# Patient Record
Sex: FEMALE | Race: WHITE | NOT HISPANIC OR LATINO | URBAN - METROPOLITAN AREA
[De-identification: names, ages, dates, MRNs, and addresses within clinical notes are randomized per-mention and may not be internally consistent; named-entity substitution may affect disease eponyms.]

---

## 2020-01-01 ENCOUNTER — INPATIENT (INPATIENT)
Facility: HOSPITAL | Age: 80
LOS: 1 days | DRG: 208 | End: 2020-03-13
Attending: INTERNAL MEDICINE | Admitting: INTERNAL MEDICINE
Payer: MEDICARE

## 2020-01-01 VITALS
HEART RATE: 111 BPM | SYSTOLIC BLOOD PRESSURE: 119 MMHG | RESPIRATION RATE: 16 BRPM | OXYGEN SATURATION: 97 % | DIASTOLIC BLOOD PRESSURE: 70 MMHG

## 2020-01-01 VITALS — HEART RATE: 98 BPM | OXYGEN SATURATION: 99 %

## 2020-01-01 DIAGNOSIS — J96.01 ACUTE RESPIRATORY FAILURE WITH HYPOXIA: ICD-10-CM

## 2020-01-01 DIAGNOSIS — J98.11 ATELECTASIS: ICD-10-CM

## 2020-01-01 DIAGNOSIS — I26.99 OTHER PULMONARY EMBOLISM WITHOUT ACUTE COR PULMONALE: ICD-10-CM

## 2020-01-01 DIAGNOSIS — R09.89 OTHER SPECIFIED SYMPTOMS AND SIGNS INVOLVING THE CIRCULATORY AND RESPIRATORY SYSTEMS: ICD-10-CM

## 2020-01-01 DIAGNOSIS — I26.09 OTHER PULMONARY EMBOLISM WITH ACUTE COR PULMONALE: ICD-10-CM

## 2020-01-01 LAB
ALBUMIN SERPL ELPH-MCNC: 2.6 G/DL — LOW (ref 3.3–5)
ALBUMIN SERPL ELPH-MCNC: 2.6 G/DL — LOW (ref 3.3–5)
ALBUMIN SERPL ELPH-MCNC: 3.1 G/DL — LOW (ref 3.3–5)
ALBUMIN SERPL ELPH-MCNC: 3.8 G/DL — SIGNIFICANT CHANGE UP (ref 3.4–5)
ALP SERPL-CCNC: 131 U/L — HIGH (ref 40–120)
ALP SERPL-CCNC: 66 U/L — SIGNIFICANT CHANGE UP (ref 40–120)
ALP SERPL-CCNC: 80 U/L — SIGNIFICANT CHANGE UP (ref 40–120)
ALP SERPL-CCNC: 89 U/L — SIGNIFICANT CHANGE UP (ref 40–120)
ALT FLD-CCNC: 133 U/L — HIGH (ref 12–42)
ALT FLD-CCNC: 293 U/L — HIGH (ref 10–45)
ALT FLD-CCNC: 4280 U/L — HIGH (ref 10–45)
ALT FLD-CCNC: 512 U/L — HIGH (ref 10–45)
ANION GAP SERPL CALC-SCNC: 15 MMOL/L — SIGNIFICANT CHANGE UP (ref 9–16)
ANION GAP SERPL CALC-SCNC: 22 MMOL/L — HIGH (ref 5–17)
ANION GAP SERPL CALC-SCNC: 23 MMOL/L — HIGH (ref 5–17)
ANION GAP SERPL CALC-SCNC: 27 MMOL/L — HIGH (ref 5–17)
ANION GAP SERPL CALC-SCNC: 27 MMOL/L — HIGH (ref 5–17)
ANION GAP SERPL CALC-SCNC: 29 MMOL/L — HIGH (ref 5–17)
APPEARANCE UR: CLEAR — SIGNIFICANT CHANGE UP
APTT BLD: 23 SEC — LOW (ref 27.5–36.3)
APTT BLD: 97.1 SEC — HIGH (ref 27.5–36.3)
AST SERPL-CCNC: 178 U/L — HIGH (ref 15–37)
AST SERPL-CCNC: 368 U/L — HIGH (ref 10–40)
AST SERPL-CCNC: 561 U/L — HIGH (ref 10–40)
AST SERPL-CCNC: 5793 U/L — HIGH (ref 10–40)
BACTERIA # UR AUTO: PRESENT /HPF
BASOPHILS # BLD AUTO: 0.06 K/UL — SIGNIFICANT CHANGE UP (ref 0–0.2)
BASOPHILS NFR BLD AUTO: 0.2 % — SIGNIFICANT CHANGE UP (ref 0–2)
BILIRUB SERPL-MCNC: 0.6 MG/DL — SIGNIFICANT CHANGE UP (ref 0.2–1.2)
BILIRUB SERPL-MCNC: 1 MG/DL — SIGNIFICANT CHANGE UP (ref 0.2–1.2)
BILIRUB SERPL-MCNC: 1 MG/DL — SIGNIFICANT CHANGE UP (ref 0.2–1.2)
BILIRUB SERPL-MCNC: 1.2 MG/DL — SIGNIFICANT CHANGE UP (ref 0.2–1.2)
BILIRUB UR-MCNC: ABNORMAL
BLD GP AB SCN SERPL QL: NEGATIVE — SIGNIFICANT CHANGE UP
BUN SERPL-MCNC: 18 MG/DL — SIGNIFICANT CHANGE UP (ref 7–23)
BUN SERPL-MCNC: 18 MG/DL — SIGNIFICANT CHANGE UP (ref 7–23)
BUN SERPL-MCNC: 20 MG/DL — SIGNIFICANT CHANGE UP (ref 7–23)
BUN SERPL-MCNC: 20 MG/DL — SIGNIFICANT CHANGE UP (ref 7–23)
BUN SERPL-MCNC: 21 MG/DL — SIGNIFICANT CHANGE UP (ref 7–23)
BUN SERPL-MCNC: 23 MG/DL — SIGNIFICANT CHANGE UP (ref 7–23)
CALCIUM SERPL-MCNC: 7.1 MG/DL — LOW (ref 8.4–10.5)
CALCIUM SERPL-MCNC: 7.3 MG/DL — LOW (ref 8.4–10.5)
CALCIUM SERPL-MCNC: 7.6 MG/DL — LOW (ref 8.4–10.5)
CALCIUM SERPL-MCNC: 9.4 MG/DL — SIGNIFICANT CHANGE UP (ref 8.5–10.5)
CHLORIDE SERPL-SCNC: 102 MMOL/L — SIGNIFICANT CHANGE UP (ref 96–108)
CHLORIDE SERPL-SCNC: 104 MMOL/L — SIGNIFICANT CHANGE UP (ref 96–108)
CHLORIDE SERPL-SCNC: 105 MMOL/L — SIGNIFICANT CHANGE UP (ref 96–108)
CHLORIDE SERPL-SCNC: 106 MMOL/L — SIGNIFICANT CHANGE UP (ref 96–108)
CHLORIDE SERPL-SCNC: 110 MMOL/L — HIGH (ref 96–108)
CHLORIDE SERPL-SCNC: 110 MMOL/L — HIGH (ref 96–108)
CHOLEST SERPL-MCNC: 156 MG/DL — SIGNIFICANT CHANGE UP (ref 10–199)
CK MB BLD-MCNC: 4.21 % — HIGH
CK MB CFR SERPL CALC: 14.9 NG/ML — HIGH (ref 0–6.7)
CK MB CFR SERPL CALC: 3.2 NG/ML — SIGNIFICANT CHANGE UP (ref 0.5–3.6)
CK SERPL-CCNC: 175 U/L — HIGH (ref 25–170)
CK SERPL-CCNC: 76 U/L — SIGNIFICANT CHANGE UP (ref 39–308)
CO2 SERPL-SCNC: 10 MMOL/L — CRITICAL LOW (ref 22–31)
CO2 SERPL-SCNC: 11 MMOL/L — LOW (ref 22–31)
CO2 SERPL-SCNC: 13 MMOL/L — LOW (ref 22–31)
CO2 SERPL-SCNC: 13 MMOL/L — LOW (ref 22–31)
CO2 SERPL-SCNC: 18 MMOL/L — LOW (ref 22–31)
CO2 SERPL-SCNC: 24 MMOL/L — SIGNIFICANT CHANGE UP (ref 22–31)
COLOR SPEC: YELLOW — SIGNIFICANT CHANGE UP
COMMENT - URINE: SIGNIFICANT CHANGE UP
CREAT SERPL-MCNC: 1.33 MG/DL — HIGH (ref 0.5–1.3)
CREAT SERPL-MCNC: 1.38 MG/DL — HIGH (ref 0.5–1.3)
CREAT SERPL-MCNC: 1.42 MG/DL — HIGH (ref 0.5–1.3)
CREAT SERPL-MCNC: 1.44 MG/DL — HIGH (ref 0.5–1.3)
CREAT SERPL-MCNC: 1.54 MG/DL — HIGH (ref 0.5–1.3)
CREAT SERPL-MCNC: 1.91 MG/DL — HIGH (ref 0.5–1.3)
DIFF PNL FLD: ABNORMAL
EOSINOPHIL # BLD AUTO: 0.08 K/UL — SIGNIFICANT CHANGE UP (ref 0–0.5)
EOSINOPHIL NFR BLD AUTO: 0.3 % — SIGNIFICANT CHANGE UP (ref 0–6)
EPI CELLS # UR: SIGNIFICANT CHANGE UP /HPF (ref 0–5)
FLU A RESULT: SIGNIFICANT CHANGE UP
FLU A RESULT: SIGNIFICANT CHANGE UP
FLUAV AG NPH QL: SIGNIFICANT CHANGE UP
FLUBV AG NPH QL: SIGNIFICANT CHANGE UP
GAS PNL BLDA: SIGNIFICANT CHANGE UP
GLUCOSE BLDC GLUCOMTR-MCNC: 51 MG/DL — LOW (ref 70–99)
GLUCOSE BLDC GLUCOMTR-MCNC: 89 MG/DL — SIGNIFICANT CHANGE UP (ref 70–99)
GLUCOSE SERPL-MCNC: 18 MG/DL — CRITICAL LOW (ref 70–99)
GLUCOSE SERPL-MCNC: 191 MG/DL — HIGH (ref 70–99)
GLUCOSE SERPL-MCNC: 213 MG/DL — HIGH (ref 70–99)
GLUCOSE SERPL-MCNC: 222 MG/DL — HIGH (ref 70–99)
GLUCOSE SERPL-MCNC: 264 MG/DL — HIGH (ref 70–99)
GLUCOSE SERPL-MCNC: 83 MG/DL — SIGNIFICANT CHANGE UP (ref 70–99)
GLUCOSE UR QL: NEGATIVE — SIGNIFICANT CHANGE UP
HBA1C BLD-MCNC: 5.2 % — SIGNIFICANT CHANGE UP (ref 4–5.6)
HCT VFR BLD CALC: 39.7 % — SIGNIFICANT CHANGE UP (ref 34.5–45)
HCT VFR BLD CALC: 40.8 % — SIGNIFICANT CHANGE UP (ref 34.5–45)
HCT VFR BLD CALC: 41.2 % — SIGNIFICANT CHANGE UP (ref 39–50)
HCT VFR BLD CALC: 43 % — SIGNIFICANT CHANGE UP (ref 34.5–45)
HDLC SERPL-MCNC: 41 MG/DL — LOW
HGB BLD-MCNC: 12.2 G/DL — SIGNIFICANT CHANGE UP (ref 11.5–15.5)
HGB BLD-MCNC: 12.2 G/DL — SIGNIFICANT CHANGE UP (ref 11.5–15.5)
HGB BLD-MCNC: 12.3 G/DL — SIGNIFICANT CHANGE UP (ref 11.5–15.5)
HGB BLD-MCNC: 13.3 G/DL — SIGNIFICANT CHANGE UP (ref 13–17)
IMM GRANULOCYTES NFR BLD AUTO: 0.7 % — SIGNIFICANT CHANGE UP (ref 0–1.5)
INR BLD: 1.34 — HIGH (ref 0.88–1.16)
KETONES UR-MCNC: ABNORMAL MG/DL
LACTATE SERPL-SCNC: 10.5 MMOL/L — CRITICAL HIGH (ref 0.5–2)
LACTATE SERPL-SCNC: 12.5 MMOL/L — CRITICAL HIGH (ref 0.5–2)
LACTATE SERPL-SCNC: 5.7 MMOL/L — CRITICAL HIGH (ref 0.4–2)
LACTATE SERPL-SCNC: 5.9 MMOL/L — CRITICAL HIGH (ref 0.4–2)
LACTATE SERPL-SCNC: 9.1 MMOL/L — CRITICAL HIGH (ref 0.5–2)
LEUKOCYTE ESTERASE UR-ACNC: ABNORMAL
LIPID PNL WITH DIRECT LDL SERPL: 91 MG/DL — SIGNIFICANT CHANGE UP
LYMPHOCYTES # BLD AUTO: 12 % — LOW (ref 13–44)
LYMPHOCYTES # BLD AUTO: 2.91 K/UL — SIGNIFICANT CHANGE UP (ref 1–3.3)
MAGNESIUM SERPL-MCNC: 2.8 MG/DL — HIGH (ref 1.6–2.6)
MCHC RBC-ENTMCNC: 26.7 PG — LOW (ref 27–34)
MCHC RBC-ENTMCNC: 27.4 PG — SIGNIFICANT CHANGE UP (ref 27–34)
MCHC RBC-ENTMCNC: 27.5 PG — SIGNIFICANT CHANGE UP (ref 27–34)
MCHC RBC-ENTMCNC: 27.7 PG — SIGNIFICANT CHANGE UP (ref 27–34)
MCHC RBC-ENTMCNC: 28.6 GM/DL — LOW (ref 32–36)
MCHC RBC-ENTMCNC: 29.9 GM/DL — LOW (ref 32–36)
MCHC RBC-ENTMCNC: 30.7 GM/DL — LOW (ref 32–36)
MCHC RBC-ENTMCNC: 32.3 GM/DL — SIGNIFICANT CHANGE UP (ref 32–36)
MCV RBC AUTO: 84.9 FL — SIGNIFICANT CHANGE UP (ref 80–100)
MCV RBC AUTO: 89.6 FL — SIGNIFICANT CHANGE UP (ref 80–100)
MCV RBC AUTO: 92.5 FL — SIGNIFICANT CHANGE UP (ref 80–100)
MCV RBC AUTO: 93.3 FL — SIGNIFICANT CHANGE UP (ref 80–100)
MONOCYTES # BLD AUTO: 0.97 K/UL — HIGH (ref 0–0.9)
MONOCYTES NFR BLD AUTO: 4 % — SIGNIFICANT CHANGE UP (ref 2–14)
NEUTROPHILS # BLD AUTO: 20.04 K/UL — HIGH (ref 1.8–7.4)
NEUTROPHILS NFR BLD AUTO: 82.8 % — HIGH (ref 43–77)
NITRITE UR-MCNC: POSITIVE
NRBC # BLD: 0 /100 WBCS — SIGNIFICANT CHANGE UP (ref 0–0)
NT-PROBNP SERPL-SCNC: 721 PG/ML — HIGH
PCO2 BLDA: 53 MMHG — HIGH (ref 32–45)
PH BLDA: 7.07 — CRITICAL LOW (ref 7.35–7.45)
PH UR: 5.5 — SIGNIFICANT CHANGE UP (ref 5–8)
PHOSPHATE SERPL-MCNC: 14.7 MG/DL — HIGH (ref 2.5–4.5)
PHOSPHATE SERPL-MCNC: 9.6 MG/DL — HIGH (ref 2.5–4.5)
PLATELET # BLD AUTO: 130 K/UL — LOW (ref 150–400)
PLATELET # BLD AUTO: 239 K/UL — SIGNIFICANT CHANGE UP (ref 150–400)
PLATELET # BLD AUTO: 87 K/UL — LOW (ref 150–400)
PLATELET # BLD AUTO: 95 K/UL — LOW (ref 150–400)
PO2 BLDA: 309 MMHG — HIGH (ref 83–108)
POTASSIUM SERPL-MCNC: 3.6 MMOL/L — SIGNIFICANT CHANGE UP (ref 3.5–5.3)
POTASSIUM SERPL-MCNC: 4.2 MMOL/L — SIGNIFICANT CHANGE UP (ref 3.5–5.3)
POTASSIUM SERPL-MCNC: 4.7 MMOL/L — SIGNIFICANT CHANGE UP (ref 3.5–5.3)
POTASSIUM SERPL-MCNC: 5.7 MMOL/L — HIGH (ref 3.5–5.3)
POTASSIUM SERPL-MCNC: 6 MMOL/L — HIGH (ref 3.5–5.3)
POTASSIUM SERPL-MCNC: 6.6 MMOL/L — CRITICAL HIGH (ref 3.5–5.3)
POTASSIUM SERPL-SCNC: 3.6 MMOL/L — SIGNIFICANT CHANGE UP (ref 3.5–5.3)
POTASSIUM SERPL-SCNC: 4.2 MMOL/L — SIGNIFICANT CHANGE UP (ref 3.5–5.3)
POTASSIUM SERPL-SCNC: 4.7 MMOL/L — SIGNIFICANT CHANGE UP (ref 3.5–5.3)
POTASSIUM SERPL-SCNC: 5.7 MMOL/L — HIGH (ref 3.5–5.3)
POTASSIUM SERPL-SCNC: 6 MMOL/L — HIGH (ref 3.5–5.3)
POTASSIUM SERPL-SCNC: 6.6 MMOL/L — CRITICAL HIGH (ref 3.5–5.3)
PROT SERPL-MCNC: 4.4 G/DL — LOW (ref 6–8.3)
PROT SERPL-MCNC: 4.5 G/DL — LOW (ref 6–8.3)
PROT SERPL-MCNC: 5.2 G/DL — LOW (ref 6–8.3)
PROT SERPL-MCNC: 7.6 G/DL — SIGNIFICANT CHANGE UP (ref 6.4–8.2)
PROT UR-MCNC: 100 MG/DL
PROTHROM AB SERPL-ACNC: 14.9 SEC — HIGH (ref 10–12.9)
RAPID RVP RESULT: SIGNIFICANT CHANGE UP
RBC # BLD: 4.41 M/UL — SIGNIFICANT CHANGE UP (ref 3.8–5.2)
RBC # BLD: 4.43 M/UL — SIGNIFICANT CHANGE UP (ref 3.8–5.2)
RBC # BLD: 4.61 M/UL — SIGNIFICANT CHANGE UP (ref 3.8–5.2)
RBC # BLD: 4.85 M/UL — SIGNIFICANT CHANGE UP (ref 4.2–5.8)
RBC # FLD: 14.5 % — SIGNIFICANT CHANGE UP (ref 10.3–14.5)
RBC # FLD: 14.5 % — SIGNIFICANT CHANGE UP (ref 10.3–14.5)
RBC # FLD: 14.6 % — HIGH (ref 10.3–14.5)
RBC # FLD: 14.8 % — HIGH (ref 10.3–14.5)
RBC CASTS # UR COMP ASSIST: ABNORMAL /HPF
RH IG SCN BLD-IMP: POSITIVE — SIGNIFICANT CHANGE UP
RSV RESULT: SIGNIFICANT CHANGE UP
RSV RNA RESP QL NAA+PROBE: SIGNIFICANT CHANGE UP
SAO2 % BLDA: 99 % — SIGNIFICANT CHANGE UP (ref 95–100)
SARS-COV-2 RNA SPEC QL NAA+PROBE: SIGNIFICANT CHANGE UP
SODIUM SERPL-SCNC: 137 MMOL/L — SIGNIFICANT CHANGE UP (ref 135–145)
SODIUM SERPL-SCNC: 141 MMOL/L — SIGNIFICANT CHANGE UP (ref 132–145)
SODIUM SERPL-SCNC: 145 MMOL/L — SIGNIFICANT CHANGE UP (ref 135–145)
SODIUM SERPL-SCNC: 146 MMOL/L — HIGH (ref 135–145)
SODIUM SERPL-SCNC: 150 MMOL/L — HIGH (ref 135–145)
SODIUM SERPL-SCNC: 150 MMOL/L — HIGH (ref 135–145)
SP GR SPEC: >=1.03 — SIGNIFICANT CHANGE UP (ref 1–1.03)
TOTAL CHOLESTEROL/HDL RATIO MEASUREMENT: 3.8 RATIO — SIGNIFICANT CHANGE UP (ref 3.3–7.1)
TRIGL SERPL-MCNC: 122 MG/DL — SIGNIFICANT CHANGE UP (ref 10–149)
TROPONIN I SERPL-MCNC: 0.32 NG/ML — HIGH (ref 0.02–0.06)
TROPONIN T SERPL-MCNC: 1.16 NG/ML — CRITICAL HIGH (ref 0–0.01)
TROPONIN T SERPL-MCNC: 1.53 NG/ML — CRITICAL HIGH (ref 0–0.01)
TSH SERPL-MCNC: 0.84 UIU/ML — SIGNIFICANT CHANGE UP (ref 0.35–4.94)
UROBILINOGEN FLD QL: 1 E.U./DL — SIGNIFICANT CHANGE UP
WBC # BLD: 24.23 K/UL — HIGH (ref 3.8–10.5)
WBC # BLD: 33.91 K/UL — HIGH (ref 3.8–10.5)
WBC # BLD: 42.11 K/UL — CRITICAL HIGH (ref 3.8–10.5)
WBC # BLD: 43.74 K/UL — CRITICAL HIGH (ref 3.8–10.5)
WBC # FLD AUTO: 24.23 K/UL — HIGH (ref 3.8–10.5)
WBC # FLD AUTO: 33.91 K/UL — HIGH (ref 3.8–10.5)
WBC # FLD AUTO: 42.11 K/UL — CRITICAL HIGH (ref 3.8–10.5)
WBC # FLD AUTO: 43.74 K/UL — CRITICAL HIGH (ref 3.8–10.5)
WBC UR QL: > 10 /HPF

## 2020-01-01 PROCEDURE — 84295 ASSAY OF SERUM SODIUM: CPT

## 2020-01-01 PROCEDURE — 87633 RESP VIRUS 12-25 TARGETS: CPT

## 2020-01-01 PROCEDURE — 36556 INSERT NON-TUNNEL CV CATH: CPT | Mod: XU

## 2020-01-01 PROCEDURE — 96375 TX/PRO/DX INJ NEW DRUG ADDON: CPT | Mod: XU

## 2020-01-01 PROCEDURE — 84100 ASSAY OF PHOSPHORUS: CPT

## 2020-01-01 PROCEDURE — 96374 THER/PROPH/DIAG INJ IV PUSH: CPT | Mod: XU

## 2020-01-01 PROCEDURE — 80048 BASIC METABOLIC PNL TOTAL CA: CPT

## 2020-01-01 PROCEDURE — 71045 X-RAY EXAM CHEST 1 VIEW: CPT | Mod: 26

## 2020-01-01 PROCEDURE — 99292 CRITICAL CARE ADDL 30 MIN: CPT | Mod: 25

## 2020-01-01 PROCEDURE — 36415 COLL VENOUS BLD VENIPUNCTURE: CPT

## 2020-01-01 PROCEDURE — 93010 ELECTROCARDIOGRAM REPORT: CPT | Mod: 76,59

## 2020-01-01 PROCEDURE — 87040 BLOOD CULTURE FOR BACTERIA: CPT

## 2020-01-01 PROCEDURE — 96376 TX/PRO/DX INJ SAME DRUG ADON: CPT | Mod: XU

## 2020-01-01 PROCEDURE — 36556 INSERT NON-TUNNEL CV CATH: CPT

## 2020-01-01 PROCEDURE — 80061 LIPID PANEL: CPT

## 2020-01-01 PROCEDURE — 82962 GLUCOSE BLOOD TEST: CPT

## 2020-01-01 PROCEDURE — 86901 BLOOD TYPING SEROLOGIC RH(D): CPT

## 2020-01-01 PROCEDURE — 87581 M.PNEUMON DNA AMP PROBE: CPT

## 2020-01-01 PROCEDURE — 82553 CREATINE MB FRACTION: CPT

## 2020-01-01 PROCEDURE — 84132 ASSAY OF SERUM POTASSIUM: CPT

## 2020-01-01 PROCEDURE — 82550 ASSAY OF CK (CPK): CPT

## 2020-01-01 PROCEDURE — 83605 ASSAY OF LACTIC ACID: CPT

## 2020-01-01 PROCEDURE — 87798 DETECT AGENT NOS DNA AMP: CPT

## 2020-01-01 PROCEDURE — 31500 INSERT EMERGENCY AIRWAY: CPT | Mod: 59

## 2020-01-01 PROCEDURE — 86850 RBC ANTIBODY SCREEN: CPT

## 2020-01-01 PROCEDURE — 87631 RESP VIRUS 3-5 TARGETS: CPT

## 2020-01-01 PROCEDURE — 85025 COMPLETE CBC W/AUTO DIFF WBC: CPT

## 2020-01-01 PROCEDURE — 82803 BLOOD GASES ANY COMBINATION: CPT

## 2020-01-01 PROCEDURE — 93005 ELECTROCARDIOGRAM TRACING: CPT | Mod: XU

## 2020-01-01 PROCEDURE — 99291 CRITICAL CARE FIRST HOUR: CPT | Mod: 25

## 2020-01-01 PROCEDURE — C1751: CPT

## 2020-01-01 PROCEDURE — 83880 ASSAY OF NATRIURETIC PEPTIDE: CPT

## 2020-01-01 PROCEDURE — 71275 CT ANGIOGRAPHY CHEST: CPT | Mod: 26

## 2020-01-01 PROCEDURE — 71045 X-RAY EXAM CHEST 1 VIEW: CPT | Mod: 26,77

## 2020-01-01 PROCEDURE — 99291 CRITICAL CARE FIRST HOUR: CPT

## 2020-01-01 PROCEDURE — 87486 CHLMYD PNEUM DNA AMP PROBE: CPT

## 2020-01-01 PROCEDURE — 85610 PROTHROMBIN TIME: CPT

## 2020-01-01 PROCEDURE — 82330 ASSAY OF CALCIUM: CPT

## 2020-01-01 PROCEDURE — 71275 CT ANGIOGRAPHY CHEST: CPT

## 2020-01-01 PROCEDURE — 80053 COMPREHEN METABOLIC PANEL: CPT

## 2020-01-01 PROCEDURE — 83735 ASSAY OF MAGNESIUM: CPT

## 2020-01-01 PROCEDURE — 87086 URINE CULTURE/COLONY COUNT: CPT

## 2020-01-01 PROCEDURE — 87186 SC STD MICRODIL/AGAR DIL: CPT

## 2020-01-01 PROCEDURE — 96372 THER/PROPH/DIAG INJ SC/IM: CPT | Mod: XU

## 2020-01-01 PROCEDURE — 83036 HEMOGLOBIN GLYCOSYLATED A1C: CPT

## 2020-01-01 PROCEDURE — 85027 COMPLETE CBC AUTOMATED: CPT

## 2020-01-01 PROCEDURE — 84484 ASSAY OF TROPONIN QUANT: CPT

## 2020-01-01 PROCEDURE — 31500 INSERT EMERGENCY AIRWAY: CPT | Mod: XU

## 2020-01-01 PROCEDURE — 85730 THROMBOPLASTIN TIME PARTIAL: CPT

## 2020-01-01 PROCEDURE — 84443 ASSAY THYROID STIM HORMONE: CPT

## 2020-01-01 PROCEDURE — 71045 X-RAY EXAM CHEST 1 VIEW: CPT

## 2020-01-01 PROCEDURE — 87635 SARS-COV-2 COVID-19 AMP PRB: CPT

## 2020-01-01 PROCEDURE — 93306 TTE W/DOPPLER COMPLETE: CPT

## 2020-01-01 PROCEDURE — 81001 URINALYSIS AUTO W/SCOPE: CPT

## 2020-01-01 RX ORDER — ETOMIDATE 2 MG/ML
20 INJECTION INTRAVENOUS ONCE
Refills: 0 | Status: COMPLETED | OUTPATIENT
Start: 2020-01-01 | End: 2020-01-01

## 2020-01-01 RX ORDER — DEXTROSE 10 % IN WATER 10 %
1000 INTRAVENOUS SOLUTION INTRAVENOUS
Refills: 0 | Status: DISCONTINUED | OUTPATIENT
Start: 2020-01-01 | End: 2020-01-01

## 2020-01-01 RX ORDER — DEXTROSE 50 % IN WATER 50 %
50 SYRINGE (ML) INTRAVENOUS ONCE
Refills: 0 | Status: COMPLETED | OUTPATIENT
Start: 2020-01-01 | End: 2020-01-01

## 2020-01-01 RX ORDER — GLUCAGON INJECTION, SOLUTION 0.5 MG/.1ML
1 INJECTION, SOLUTION SUBCUTANEOUS ONCE
Refills: 0 | Status: COMPLETED | OUTPATIENT
Start: 2020-01-01 | End: 2020-01-01

## 2020-01-01 RX ORDER — FENTANYL CITRATE 50 UG/ML
50 INJECTION INTRAVENOUS ONCE
Refills: 0 | Status: DISCONTINUED | OUTPATIENT
Start: 2020-01-01 | End: 2020-01-01

## 2020-01-01 RX ORDER — HEPARIN SODIUM 5000 [USP'U]/ML
INJECTION INTRAVENOUS; SUBCUTANEOUS
Qty: 25000 | Refills: 0 | Status: DISCONTINUED | OUTPATIENT
Start: 2020-01-01 | End: 2020-01-01

## 2020-01-01 RX ORDER — CALCIUM GLUCONATE 100 MG/ML
2 VIAL (ML) INTRAVENOUS ONCE
Refills: 0 | Status: COMPLETED | OUTPATIENT
Start: 2020-01-01 | End: 2020-01-01

## 2020-01-01 RX ORDER — DOBUTAMINE HCL 250MG/20ML
3 VIAL (ML) INTRAVENOUS
Qty: 500 | Refills: 0 | Status: DISCONTINUED | OUTPATIENT
Start: 2020-01-01 | End: 2020-01-01

## 2020-01-01 RX ORDER — CEFTRIAXONE 500 MG/1
1000 INJECTION, POWDER, FOR SOLUTION INTRAMUSCULAR; INTRAVENOUS EVERY 24 HOURS
Refills: 0 | Status: DISCONTINUED | OUTPATIENT
Start: 2020-01-01 | End: 2020-01-01

## 2020-01-01 RX ORDER — METOPROLOL TARTRATE 50 MG
25 TABLET ORAL ONCE
Refills: 0 | Status: COMPLETED | OUTPATIENT
Start: 2020-01-01 | End: 2020-01-01

## 2020-01-01 RX ORDER — PHENYLEPHRINE HYDROCHLORIDE 10 MG/ML
0 INJECTION INTRAVENOUS ONCE
Refills: 0 | Status: COMPLETED | OUTPATIENT
Start: 2020-01-01 | End: 2020-01-01

## 2020-01-01 RX ORDER — CHLORHEXIDINE GLUCONATE 213 G/1000ML
15 SOLUTION TOPICAL EVERY 12 HOURS
Refills: 0 | Status: DISCONTINUED | OUTPATIENT
Start: 2020-01-01 | End: 2020-01-01

## 2020-01-01 RX ORDER — HEPARIN SODIUM 5000 [USP'U]/ML
4000 INJECTION INTRAVENOUS; SUBCUTANEOUS EVERY 6 HOURS
Refills: 0 | Status: DISCONTINUED | OUTPATIENT
Start: 2020-01-01 | End: 2020-01-01

## 2020-01-01 RX ORDER — HEPARIN SODIUM 5000 [USP'U]/ML
8000 INJECTION INTRAVENOUS; SUBCUTANEOUS EVERY 6 HOURS
Refills: 0 | Status: DISCONTINUED | OUTPATIENT
Start: 2020-01-01 | End: 2020-01-01

## 2020-01-01 RX ORDER — ALTEPLASE 100 MG
50 KIT INTRAVENOUS ONCE
Refills: 0 | Status: COMPLETED | OUTPATIENT
Start: 2020-01-01 | End: 2020-01-01

## 2020-01-01 RX ORDER — HYDROCORTISONE 20 MG
50 TABLET ORAL EVERY 6 HOURS
Refills: 0 | Status: DISCONTINUED | OUTPATIENT
Start: 2020-01-01 | End: 2020-01-01

## 2020-01-01 RX ORDER — HEPARIN SODIUM 5000 [USP'U]/ML
1700 INJECTION INTRAVENOUS; SUBCUTANEOUS
Qty: 25000 | Refills: 0 | Status: DISCONTINUED | OUTPATIENT
Start: 2020-01-01 | End: 2020-01-01

## 2020-01-01 RX ORDER — CEFTRIAXONE 500 MG/1
1000 INJECTION, POWDER, FOR SOLUTION INTRAMUSCULAR; INTRAVENOUS ONCE
Refills: 0 | Status: COMPLETED | OUTPATIENT
Start: 2020-01-01 | End: 2020-01-01

## 2020-01-01 RX ORDER — FENTANYL CITRATE 50 UG/ML
0.5 INJECTION INTRAVENOUS
Qty: 2500 | Refills: 0 | Status: DISCONTINUED | OUTPATIENT
Start: 2020-01-01 | End: 2020-01-01

## 2020-01-01 RX ORDER — PANTOPRAZOLE SODIUM 20 MG/1
40 TABLET, DELAYED RELEASE ORAL EVERY 24 HOURS
Refills: 0 | Status: DISCONTINUED | OUTPATIENT
Start: 2020-01-01 | End: 2020-01-01

## 2020-01-01 RX ORDER — SODIUM BICARBONATE 1 MEQ/ML
150 SYRINGE (ML) INTRAVENOUS ONCE
Refills: 0 | Status: COMPLETED | OUTPATIENT
Start: 2020-01-01 | End: 2020-01-01

## 2020-01-01 RX ORDER — SODIUM BICARBONATE 1 MEQ/ML
100 SYRINGE (ML) INTRAVENOUS ONCE
Refills: 0 | Status: COMPLETED | OUTPATIENT
Start: 2020-01-01 | End: 2020-01-01

## 2020-01-01 RX ORDER — FENTANYL CITRATE 50 UG/ML
100 INJECTION INTRAVENOUS ONCE
Refills: 0 | Status: DISCONTINUED | OUTPATIENT
Start: 2020-01-01 | End: 2020-01-01

## 2020-01-01 RX ORDER — DILTIAZEM HCL 120 MG
20 CAPSULE, EXT RELEASE 24 HR ORAL ONCE
Refills: 0 | Status: COMPLETED | OUTPATIENT
Start: 2020-01-01 | End: 2020-01-01

## 2020-01-01 RX ORDER — NOREPINEPHRINE BITARTRATE/D5W 8 MG/250ML
0.05 PLASTIC BAG, INJECTION (ML) INTRAVENOUS
Qty: 32 | Refills: 0 | Status: DISCONTINUED | OUTPATIENT
Start: 2020-01-01 | End: 2020-01-01

## 2020-01-01 RX ORDER — HEPARIN SODIUM 5000 [USP'U]/ML
8000 INJECTION INTRAVENOUS; SUBCUTANEOUS ONCE
Refills: 0 | Status: COMPLETED | OUTPATIENT
Start: 2020-01-01 | End: 2020-01-01

## 2020-01-01 RX ORDER — SODIUM CHLORIDE 9 MG/ML
1000 INJECTION INTRAMUSCULAR; INTRAVENOUS; SUBCUTANEOUS
Refills: 0 | Status: DISCONTINUED | OUTPATIENT
Start: 2020-01-01 | End: 2020-01-01

## 2020-01-01 RX ORDER — HEPARIN SODIUM 5000 [USP'U]/ML
1800 INJECTION INTRAVENOUS; SUBCUTANEOUS
Qty: 25000 | Refills: 0 | Status: DISCONTINUED | OUTPATIENT
Start: 2020-01-01 | End: 2020-01-01

## 2020-01-01 RX ORDER — SUCCINYLCHOLINE CHLORIDE 100 MG/5ML
100 SYRINGE (ML) INTRAVENOUS ONCE
Refills: 0 | Status: COMPLETED | OUTPATIENT
Start: 2020-01-01 | End: 2020-01-01

## 2020-01-01 RX ORDER — DOPAMINE HYDROCHLORIDE 40 MG/ML
10 INJECTION, SOLUTION, CONCENTRATE INTRAVENOUS
Qty: 400 | Refills: 0 | Status: DISCONTINUED | OUTPATIENT
Start: 2020-01-01 | End: 2020-01-01

## 2020-01-01 RX ORDER — ACETAMINOPHEN 500 MG
1000 TABLET ORAL ONCE
Refills: 0 | Status: COMPLETED | OUTPATIENT
Start: 2020-01-01 | End: 2020-01-01

## 2020-01-01 RX ORDER — CHLORHEXIDINE GLUCONATE 213 G/1000ML
1 SOLUTION TOPICAL
Refills: 0 | Status: DISCONTINUED | OUTPATIENT
Start: 2020-01-01 | End: 2020-01-01

## 2020-01-01 RX ORDER — CISATRACURIUM BESYLATE 2 MG/ML
10 INJECTION INTRAVENOUS ONCE
Refills: 0 | Status: COMPLETED | OUTPATIENT
Start: 2020-01-01 | End: 2020-01-01

## 2020-01-01 RX ORDER — NOREPINEPHRINE BITARTRATE/D5W 8 MG/250ML
0.05 PLASTIC BAG, INJECTION (ML) INTRAVENOUS
Qty: 16 | Refills: 0 | Status: DISCONTINUED | OUTPATIENT
Start: 2020-01-01 | End: 2020-01-01

## 2020-01-01 RX ORDER — SODIUM CHLORIDE 9 MG/ML
1000 INJECTION, SOLUTION INTRAVENOUS
Refills: 0 | Status: DISCONTINUED | OUTPATIENT
Start: 2020-01-01 | End: 2020-01-01

## 2020-01-01 RX ORDER — SODIUM BICARBONATE 1 MEQ/ML
50 SYRINGE (ML) INTRAVENOUS ONCE
Refills: 0 | Status: COMPLETED | OUTPATIENT
Start: 2020-01-01 | End: 2020-01-01

## 2020-01-01 RX ORDER — PROPOFOL 10 MG/ML
5 INJECTION, EMULSION INTRAVENOUS
Qty: 1000 | Refills: 0 | Status: DISCONTINUED | OUTPATIENT
Start: 2020-01-01 | End: 2020-01-01

## 2020-01-01 RX ORDER — SODIUM CHLORIDE 9 MG/ML
1000 INJECTION INTRAMUSCULAR; INTRAVENOUS; SUBCUTANEOUS ONCE
Refills: 0 | Status: COMPLETED | OUTPATIENT
Start: 2020-01-01 | End: 2020-01-01

## 2020-01-01 RX ORDER — DILTIAZEM HCL 120 MG
60 CAPSULE, EXT RELEASE 24 HR ORAL ONCE
Refills: 0 | Status: COMPLETED | OUTPATIENT
Start: 2020-01-01 | End: 2020-01-01

## 2020-01-01 RX ORDER — PHENYLEPHRINE HYDROCHLORIDE 10 MG/ML
0.1 INJECTION INTRAVENOUS
Qty: 40 | Refills: 0 | Status: DISCONTINUED | OUTPATIENT
Start: 2020-01-01 | End: 2020-01-01

## 2020-01-01 RX ORDER — NOREPINEPHRINE BITARTRATE/D5W 8 MG/250ML
1 PLASTIC BAG, INJECTION (ML) INTRAVENOUS
Qty: 8 | Refills: 0 | Status: DISCONTINUED | OUTPATIENT
Start: 2020-01-01 | End: 2020-01-01

## 2020-01-01 RX ORDER — VASOPRESSIN 20 [USP'U]/ML
0.04 INJECTION INTRAVENOUS
Qty: 50 | Refills: 0 | Status: DISCONTINUED | OUTPATIENT
Start: 2020-01-01 | End: 2020-01-01

## 2020-01-01 RX ADMIN — CEFTRIAXONE 100 MILLIGRAM(S): 500 INJECTION, POWDER, FOR SOLUTION INTRAMUSCULAR; INTRAVENOUS at 18:41

## 2020-01-01 RX ADMIN — PANTOPRAZOLE SODIUM 40 MILLIGRAM(S): 20 TABLET, DELAYED RELEASE ORAL at 07:22

## 2020-01-01 RX ADMIN — Medication 50 MILLILITER(S): at 15:10

## 2020-01-01 RX ADMIN — CHLORHEXIDINE GLUCONATE 15 MILLILITER(S): 213 SOLUTION TOPICAL at 18:40

## 2020-01-01 RX ADMIN — Medication 60 MILLIGRAM(S): at 16:34

## 2020-01-01 RX ADMIN — Medication 100 MILLIEQUIVALENT(S): at 06:45

## 2020-01-01 RX ADMIN — Medication 4.55 MICROGRAM(S)/KG/MIN: at 15:26

## 2020-01-01 RX ADMIN — FENTANYL CITRATE 100 MICROGRAM(S): 50 INJECTION INTRAVENOUS at 00:12

## 2020-01-01 RX ADMIN — Medication 30 MILLILITER(S): at 18:42

## 2020-01-01 RX ADMIN — Medication 25 MILLIGRAM(S): at 19:13

## 2020-01-01 RX ADMIN — SODIUM CHLORIDE 1000 MILLILITER(S): 9 INJECTION INTRAMUSCULAR; INTRAVENOUS; SUBCUTANEOUS at 19:13

## 2020-01-01 RX ADMIN — Medication 50 MILLILITER(S): at 17:27

## 2020-01-01 RX ADMIN — ALTEPLASE 200 MILLIGRAM(S): KIT at 04:58

## 2020-01-01 RX ADMIN — ETOMIDATE 20 MILLIGRAM(S): 2 INJECTION INTRAVENOUS at 21:48

## 2020-01-01 RX ADMIN — VASOPRESSIN 2.4 UNIT(S)/MIN: 20 INJECTION INTRAVENOUS at 09:10

## 2020-01-01 RX ADMIN — Medication 4.55 MICROGRAM(S)/KG/MIN: at 01:03

## 2020-01-01 RX ADMIN — CHLORHEXIDINE GLUCONATE 1 APPLICATION(S): 213 SOLUTION TOPICAL at 18:41

## 2020-01-01 RX ADMIN — GLUCAGON INJECTION, SOLUTION 1 MILLIGRAM(S): 0.5 INJECTION, SOLUTION SUBCUTANEOUS at 06:30

## 2020-01-01 RX ADMIN — HEPARIN SODIUM 1800 UNIT(S)/HR: 5000 INJECTION INTRAVENOUS; SUBCUTANEOUS at 23:50

## 2020-01-01 RX ADMIN — PROPOFOL 2.91 MICROGRAM(S)/KG/MIN: 10 INJECTION, EMULSION INTRAVENOUS at 00:06

## 2020-01-01 RX ADMIN — Medication 150 MILLIEQUIVALENT(S): at 05:10

## 2020-01-01 RX ADMIN — CISATRACURIUM BESYLATE 10 MILLIGRAM(S): 2 INJECTION INTRAVENOUS at 07:02

## 2020-01-01 RX ADMIN — Medication 50 MILLIEQUIVALENT(S): at 06:45

## 2020-01-01 RX ADMIN — PHENYLEPHRINE HYDROCHLORIDE 0 MILLIGRAM(S): 10 INJECTION INTRAVENOUS at 21:50

## 2020-01-01 RX ADMIN — HEPARIN SODIUM 17 UNIT(S)/HR: 5000 INJECTION INTRAVENOUS; SUBCUTANEOUS at 19:40

## 2020-01-01 RX ADMIN — Medication 4.55 MICROGRAM(S)/KG/MIN: at 03:00

## 2020-01-01 RX ADMIN — SODIUM CHLORIDE 1000 MILLILITER(S): 9 INJECTION INTRAMUSCULAR; INTRAVENOUS; SUBCUTANEOUS at 20:06

## 2020-01-01 RX ADMIN — PROPOFOL 2.91 MICROGRAM(S)/KG/MIN: 10 INJECTION, EMULSION INTRAVENOUS at 18:41

## 2020-01-01 RX ADMIN — CEFTRIAXONE 100 MILLIGRAM(S): 500 INJECTION, POWDER, FOR SOLUTION INTRAMUSCULAR; INTRAVENOUS at 17:35

## 2020-01-01 RX ADMIN — SODIUM CHLORIDE 70 MILLILITER(S): 9 INJECTION INTRAMUSCULAR; INTRAVENOUS; SUBCUTANEOUS at 06:58

## 2020-01-01 RX ADMIN — Medication 20 MILLIGRAM(S): at 16:35

## 2020-01-01 RX ADMIN — Medication 100 MILLIGRAM(S): at 21:48

## 2020-01-01 RX ADMIN — Medication 200 GRAM(S): at 06:56

## 2020-01-01 RX ADMIN — FENTANYL CITRATE 50 MICROGRAM(S): 50 INJECTION INTRAVENOUS at 23:00

## 2020-01-01 RX ADMIN — FENTANYL CITRATE 100 MICROGRAM(S): 50 INJECTION INTRAVENOUS at 22:15

## 2020-01-01 RX ADMIN — HEPARIN SODIUM 8000 UNIT(S): 5000 INJECTION INTRAVENOUS; SUBCUTANEOUS at 23:50

## 2020-01-01 RX ADMIN — Medication 182 MICROGRAM(S)/KG/MIN: at 00:03

## 2020-01-01 RX ADMIN — FENTANYL CITRATE 4.85 MICROGRAM(S)/KG/HR: 50 INJECTION INTRAVENOUS at 00:03

## 2020-01-01 RX ADMIN — FENTANYL CITRATE 50 MICROGRAM(S): 50 INJECTION INTRAVENOUS at 01:06

## 2020-01-01 RX ADMIN — Medication 50 MILLIGRAM(S): at 18:41

## 2020-01-01 RX ADMIN — Medication 4.55 MICROGRAM(S)/KG/MIN: at 09:10

## 2020-01-01 RX ADMIN — Medication 20 MILLILITER(S): at 15:09

## 2020-01-01 RX ADMIN — FENTANYL CITRATE 50 MICROGRAM(S): 50 INJECTION INTRAVENOUS at 23:30

## 2020-01-01 RX ADMIN — ALTEPLASE 50 MILLIGRAM(S): KIT at 05:15

## 2020-03-11 NOTE — ED ADULT TRIAGE NOTE - CHIEF COMPLAINT QUOTE
Patient c/o shortness of breath while going up her apartment steps followed by midsternal chest pain. Also reports bilateral lower leg swelling. Denies pmhx, recent travel or sick contacts

## 2020-03-11 NOTE — ED PROVIDER NOTE - CLINICAL SUMMARY MEDICAL DECISION MAKING FREE TEXT BOX
80 y/o female presents with progressively worsening SOB while walking up stairs x 1 week. EKG with afib. Will check flu swab, repeat lactate, and re-evaluate. 80 y/o female presents with progressively worsening SOB while walking up stairs x 1 week. EKG with afib. Will check flu swab, repeat lactate, and re-evaluate.    pt with progressive worsening of resp status, decision was made to intubate for impending resp failure. see procedure note for details.  gas noted, likely respiratory acidosis, hypotensive, central line placed by dr moore, started levophed drip, discussed vent setting changes with dr foster, accepted for admission to MICU by Dr Foster.

## 2020-03-11 NOTE — ED ADULT NURSE REASSESSMENT NOTE - NS ED NURSE REASSESS COMMENT FT1
Pt became restless and dyspneic while using bedpan. Dr Vance called to bedside. Pt move to Resus Room in preparation for intubation
Pt intubated by Dr Vance.   20mg etomidate and 100mg Succinylcholine IV administered.  Et tube 7.5, 25 at lip. secured with color change on capno and bilateral breath sounds.  Vent setting Vt 600, Rt 18, PEEP 5, FiO2 100%
Pt taken to CT scan. Returned to Resus room accompanied by Dr Vance
Patient now endorsing incr work of breathing and SoB. RN Boris Scott at bedside, patient moved to [facility], attending Pinky aware and at the bedside for intubation.
Received patient from [facility]. Patient is resting in stretcher, cardiac telemetry and cont pulse ox maintained, speaking full sentences in no respiratory distress. VS per flowsheet, IVF ongoing, will repeat lactate after completion. Denies cp, fevers, chills at this time.

## 2020-03-11 NOTE — ED PROCEDURE NOTE - CPROC ED INFORMED CONSENT1
pt sp intubation requiring emergent central line placement for medication/fluid administration/This was an emergent procedure and consent was implied.

## 2020-03-11 NOTE — ED PROVIDER NOTE - CCCP TRG CHIEF CMPLNT
Consent: The patient's consent was obtained including but not limited to risks of crusting, scabbing, blistering, scarring, darker or lighter pigmentary change, recurrence, incomplete removal and infection. Render Post-Care Instructions In Note?: no Number Of Freeze-Thaw Cycles: 1 freeze-thaw cycle Duration Of Freeze Thaw-Cycle (Seconds): 0 Post-Care Instructions: I reviewed with the patient in detail post-care instructions. Patient is to wear sunprotection, and avoid picking at any of the treated lesions. Pt may apply Vaseline to crusted or scabbing areas. Aperture Size (Optional): C Total Number Of Aks Treated: 4 Detail Level: Generalized shortness of breath

## 2020-03-11 NOTE — ED ADULT NURSE REASSESSMENT NOTE - NSIMPLEMENTINTERV_GEN_ALL_ED
Implemented All Fall Risk Interventions:  [place] to call system. Call bell, personal items and telephone within reach. Instruct patient to call for assistance. Room bathroom lighting operational. Non-slip footwear when patient is off stretcher. Physically safe environment: no spills, clutter or unnecessary equipment. Stretcher in lowest position, wheels locked, appropriate side rails in place. Provide visual cue, wrist band, yellow gown, etc. Monitor gait and stability. Monitor for mental status changes and reorient to person, place, and time. Review medications for side effects contributing to fall risk. Reinforce activity limits and safety measures with patient and family.

## 2020-03-11 NOTE — ED PROVIDER NOTE - PROGRESS NOTE DETAILS
Spoke with Dr. Miramontes from cardiology, who recommends considering respiratory viral infection. Will hydrate, check repeat lactate, check flu swab, and re-evaluate for admission. pt with progressive worsening of resp status, decision was made to intubate for impending resp failure. pt with progressive worsening of resp status, decision was made to intubate for impending resp failure. see procedure note for details.  gas noted, likely respiratory acidosis, discussed vent setting changes with dr foster, accepted for admission to MICU by Dr Foster. (+) PE on CTA notified by radiology resident, heparin started

## 2020-03-12 NOTE — DIETITIAN INITIAL EVALUATION ADULT. - OTHER INFO
78 yo/female with no PMHx, presented w/SOB and ABDALLA. Pt found to be in Afib w/respirator distress and was intubated for airway protection. CTA showing B/L distal main pulm artery PEs w/evidence of R. heart strain. tPA pushed. RSV/Flu negative. Initially R/O COVID though taken off of precautions (per epidemiology) as clinical picture not consistent w/COVID. Pt discussed during MICU rounds. She remains intubated on VC/AC mode, sedated on propofol @ 3mL/hr (79kcal/day from lipids) and fentanyl. MAP 80, though downtrending, requiring vasopressin and levophed for BP support (off of phenylephrine this AM); plan to cap levophed at this time. Pt hypothermic to 95F; sanaz hugger placed. NPO w/no dobhoff in place, no plan to start feeds yet per team. No family at bedside. NKFA per chart. Skin intact pressure-wise. Will continue to follow per RD protocol.

## 2020-03-12 NOTE — H&P ADULT - ATTENDING COMMENTS
This patient was d/w the ED physician at [facility].  She was evaluated with the residents and management decisions were made, see above for the details.  I agree with the A/P.    This is an elderly female with no significant PMH ans she does not visit a PMD. She was havine ABDALLA wi=hich worsen and now very SOB.  At the ED she was in afib with RVR, was given cardizem, had leukocytosis, positive U/A for UTI, her lactate was elevated, received 3L IVF.  She was hypotensive and was on phenylephrine and later norepinephrine.  CTA showed bilateral PE with strain pattern, echo whowed underfilled L ventricle with the walls touching.  She was hypotensive in shock and received tPA.  She was very acidotic and received several amps of bicarb.  She has a poor prognosis.  PERT team came to manage at bedside.  -AMS  -acute respiratory failure on ventilator  -UTI   -shock  CC time 55 mins

## 2020-03-12 NOTE — H&P ADULT - NSHPLABSRESULTS_GEN_ALL_CORE
.  LABS:                         12.3   42.11 )-----------( 87       ( 12 Mar 2020 06:18 )             43.0     03-12    150<H>  |  110<H>  |  21  ----------------------------<  83  4.2   |  13<L>  |  1.54<H>    Ca    7.1<L>      12 Mar 2020 09:14  Phos  14.7     03-12  Mg     2.8     03-12    TPro  4.5<L>  /  Alb  2.6<L>  /  TBili  1.0  /  DBili  x   /  AST  561<H>  /  ALT  512<H>  /  AlkPhos  89  03-12    PT/INR - ( 11 Mar 2020 23:45 )   PT: 14.9 sec;   INR: 1.34          PTT - ( 11 Mar 2020 23:45 )  PTT:23.0 sec  Urinalysis Basic - ( 11 Mar 2020 23:07 )    Color: Yellow / Appearance: Clear / SG: >=1.030 / pH: x  Gluc: x / Ketone: Trace mg/dL  / Bili: Moderate / Urobili: 1.0 E.U./dL   Blood: x / Protein: 100 mg/dL / Nitrite: POSITIVE   Leuk Esterase: Small / RBC: 5-10 /HPF / WBC > 10 /HPF   Sq Epi: x / Non Sq Epi: 0-5 /HPF / Bacteria: Present /HPF      CARDIAC MARKERS ( 12 Mar 2020 05:53 )  x     / 1.16 ng/mL / x     / x     / x      CARDIAC MARKERS ( 12 Mar 2020 04:48 )  x     / 1.53 ng/mL / 175 U/L / x     / 14.9 ng/mL  CARDIAC MARKERS ( 11 Mar 2020 16:40 )  0.322 ng/mL / x     / 76 U/L / x     / 3.2 ng/mL        Lactate, Blood: 10.5 mmol/L (03-12 @ 05:53)  Lactate, Blood: 9.1 mmol/L (03-12 @ 04:48)      RADIOLOGY, EKG & ADDITIONAL TESTS: Reviewed.

## 2020-03-12 NOTE — CONSULT NOTE ADULT - ASSESSMENT
Assessment:  79 year old female with no significant past medical history who per chart has not seen a doctor in many years, presented to ED c/o SOB. Reports she lives in a 4th floor walk-up, and notes progressively increasing SOB with walking up the stairs requiring frequent breaks. And yesterday patient reported she became SOB while walking up 1 flight of stairs but was unable to "regain her breath" which caused neighbors  to call 911.  Admits to intermittent irregular palpitations for "a long time.   Patient denies any fever, chills, cough/URI symptoms, or CP. She states she has not left the apartment for about 1 week, and denies any recent known sick contacts and no recent travel.  COVID-19 was ruled out.  CTA done 3/12/20 revealed Bilateral pulmonary emboli with secondary signs consistent with right heart strain. Structural Heart Disease team, Dr. Reyes, was consulted for possibly ECMO vs. thrombectomy.      Plan:  Problem 1: PE  -s/p tPA x2 last dose 5am today  -started on Heparin infusion- now stopped due to poor prognosis  -No additional Intervention at this time - Structural Heart team signing off.  Thank you for the consult    Problem 2: Respiratory failure due to PE  -COVID-19 ruled out  -vent management per primary team    Problem 3:  Cardiogenic shock with poor prognosis  -patient on Vaso 0.04u/min and Norepinephrine 1.707 mch/kg/min now capped due to poor prognosis    Problem 4:UTI  -agree with continuing Ceftriaxone       I have reviewed clinical labs tests and reports, radiology tests and reports, as well as old patient medical records, and discussed with the referring physician.
78yo woman no known PMH [facility] with acute hypoxemic respiratory failure 2/2 massive PE with possible mixed cardiogenic +/- septic shock.

## 2020-03-12 NOTE — PROGRESS NOTE ADULT - ASSESSMENT
80 y/o F w/no known PMHx presenting with SOB, s/p intubation, found to have A. Fib w/RVR, massive pulmonary embolism with resulting obstructive shock, and UTI (positive UA). now status post tPa and requiring very high dose pressors, and started on ceftriaxone for UTI.    NEUROLOGY  #Need for Sedation  -Sedated on propofol & fentanyl, RASS goal - 2 to -1    CARDIAC  #Obstructive Shock 2/2 massive pulmonary embolism  Bedside echocardiogram performed with positive McConnel's sign. Follow up CT w. PE protocol revealed bilateral distal main PA pulmonary embolism. Patient requiring pressor support due to clot burden. PERT team activated, patient received TPA and started on heparin gtts. Very significant PE and high pressor support. ABG revealed very significant lactic acidosis. Very poor prognosis given full clinical picture.  - C/w pressor support and heparin gtts    #Afib w/RVR  S/p cardizem 20 mg IV 60 po in the ED at [facility], which converted to sinus with intermittent runs of afib after transfer, unknown chronicity   - c/w heparin gtt as CHADsVASC >2 suggesting AC indicated at this time     Pulmonary  #Acute hypercarbic respiratory failure  -ABG w/mixed metabolic and respiratory acidosis (s/p 6 amps of bicarb), likely in the setting of PE w/ dead space w/ poor ventilation + lactic acidosis   -Monitor for improvement w/TPA  -F/u official echo     Gastrointestinal  #Acute Liver Injury  Elevated transaminases w/o hyperbilirubinemia, likely in the setting of shock liver  - Continue to trend    Infectious  #Severe Sepsis   -Elevated lactate, +UA   -s/p Ceftriaxone in ED, s/p 3L NS (~30 cc/kg)  -Likely combination of septic shock and distributive shock given severe lactic acidosis   -f/u procalcitonin  -F/u UCx, BCx   -Flu neg, RVP neg, COVID-19 sent, presumptively negative given lack of lung findings on CT outside of PE    Renal  #Creatinine elevation  -s/p fluid resuscitation, unclear baseline  -poor UOP in setting of shock    Disposition: MICU, patient has a poor prognosis, severe acidosis, poor ventilation.   DVT PPX: heparin gtts, s/p tPA this morning  GI PPX: pantoprazole  Diet: NPO

## 2020-03-12 NOTE — DIETITIAN INITIAL EVALUATION ADULT. - ENERGY NEEDS
Height 68"; ABW 97kg; IBW 63.5kg; 153%IBW  BMI 32.5  Ideal body weight used for calculations as pt >120% of IBW. Needs estimated for age and adjusted for vent, shock. Fluids per team 2/2 shock

## 2020-03-12 NOTE — CONSULT NOTE ADULT - SUBJECTIVE AND OBJECTIVE BOX
PULMONARY EMBOLISM RESPONSE TEAM (PERT) INITIAL EVALUATION    HPI:      REVIEW OF SYSTEMS:  UNABLE TO OBTAIN 2/2 CURRENT CONDITION    PAST MEDICAL & SURGICAL HISTORY:  No pertinent past medical history  No significant past surgical history    FAMILY HISTORY:  UNABLE TO OBTAIN    SOCIAL HISTORY: UNABLE TO OBTAIN  Smoking Status: [ ] Current, [ ] Former, [ ] Never  Pack Years:    MEDICATIONS:  Pulmonary:    Antimicrobials:    Anticoagulants:  heparin  Infusion.  Unit(s)/Hr IV Continuous <Continuous>  heparin  Injectable 8000 Unit(s) IV Push every 6 hours PRN  heparin  Injectable 4000 Unit(s) IV Push every 6 hours PRN    Onc:    GI/:    Endocrine:  vasopressin Infusion 0.02 Unit(s)/Min IV Continuous <Continuous>    Cardiac:  DOPamine Infusion 10 MICROgram(s)/kG/Min IV Continuous <Continuous>  norepinephrine Infusion 0.05 MICROgram(s)/kG/Min IV Continuous <Continuous>  norepinephrine Infusion 0.05 MICROgram(s)/kG/Min IV Continuous <Continuous>    Other Medications:  fentaNYL   Infusion. 0.5 MICROgram(s)/kG/Hr IV Continuous <Continuous>  propofol Infusion 5 MICROgram(s)/kG/Min IV Continuous <Continuous>  sodium chloride 0.9%. 1000 milliLiter(s) IV Continuous <Continuous>    Allergies    No Known Allergies    Intolerances    Vital Signs Last 24 Hrs  T(C): 36.2 (12 Mar 2020 00:48), Max: 37.3 (11 Mar 2020 16:37)  T(F): 97.2 (12 Mar 2020 00:48), Max: 99.2 (11 Mar 2020 16:37)  HR: 67 (12 Mar 2020 03:30) (61 - 130)  BP: 95/62 (12 Mar 2020 03:30) (65/49 - 168/73)  BP(mean): 72 (12 Mar 2020 03:30) (60 - 100)  RR: 16 (12 Mar 2020 03:30) (13 - 24)  SpO2: 100% (12 Mar 2020 03:30) (80% - 100%)    03-11 @ 07:01  -  03-12 @ 04:08  --------------------------------------------------------  IN: 310.8 mL / OUT: 15 mL / NET: 295.8 mL      Mode: AC/ CMV (Assist Control/ Continuous Mandatory Ventilation)  RR (machine): 12  TV (machine): 500  FiO2: 100  PEEP: 5  ITime: 1  MAP: 11  PIP: 21      PHYSICAL EXAM:  Constitutional: WDWN  Head: NC/AT  EENT: PERRL, anicteric sclera; oropharynx clear, MMM  Neck: supple, no appreciable JVD  Respiratory: CTA B/L; no W/R/R  Cardiovascular: +S1/S2, RRR  Gastrointestinal: soft, NT/ND; +BSx4  Extremities: WWP; no edema, clubbing or cyanosis  Vascular: 2+ radial, DP/PT pulses B/L  Neurological: AAOx3; no focal deficits    LABS:  ABG - ( 11 Mar 2020 22:30 )  pH, Arterial: 7.07  pH, Blood: x     /  pCO2: 53    /  pO2: 309   / HCO3: x     / Base Excess: x     /  SaO2: 99                  CBC Full  -  ( 11 Mar 2020 16:40 )  WBC Count : 24.23 K/uL  RBC Count : 4.85 M/uL  Hemoglobin : 13.3 g/dL  Hematocrit : 41.2 %  Platelet Count - Automated : 239 K/uL  Mean Cell Volume : 84.9 fl  Mean Cell Hemoglobin : 27.4 pg  Mean Cell Hemoglobin Concentration : 32.3 gm/dL  Auto Neutrophil # : 20.04 K/uL  Auto Lymphocyte # : 2.91 K/uL  Auto Monocyte # : 0.97 K/uL  Auto Eosinophil # : 0.08 K/uL  Auto Basophil # : 0.06 K/uL  Auto Neutrophil % : 82.8 %  Auto Lymphocyte % : 12.0 %  Auto Monocyte % : 4.0 %  Auto Eosinophil % : 0.3 %  Auto Basophil % : 0.2 %    03-11    141  |  102  |  18  ----------------------------<  213<H>  3.6   |  24  |  1.33<H>    Ca    9.4      11 Mar 2020 16:40    TPro  7.6  /  Alb  3.8  /  TBili  0.6  /  DBili  x   /  AST  178<H>  /  ALT  133<H>  /  AlkPhos  66  03-11    PT/INR - ( 11 Mar 2020 23:45 )   PT: 14.9 sec;   INR: 1.34          PTT - ( 11 Mar 2020 23:45 )  PTT:23.0 sec      Urinalysis Basic - ( 11 Mar 2020 23:07 )    Color: Yellow / Appearance: Clear / SG: >=1.030 / pH: x  Gluc: x / Ketone: Trace mg/dL  / Bili: Moderate / Urobili: 1.0 E.U./dL   Blood: x / Protein: 100 mg/dL / Nitrite: POSITIVE   Leuk Esterase: Small / RBC: 5-10 /HPF / WBC > 10 /HPF   Sq Epi: x / Non Sq Epi: 0-5 /HPF / Bacteria: Present /HPF                RADIOLOGY & ADDITIONAL STUDIES: PULMONARY EMBOLISM RESPONSE TEAM (PERT) INITIAL EVALUATION    HPI:  History obtained exclusively via charts as she is currently intubated and sedated.    78yo woman no known PMH but apparently has not seen physician in years BIBEMS to [facility] w/ intermittent palpitations and worsening of progressive ABDALLA. Apparently has had progressive ABDALLA most noticeable when climbing stairs in her apartment; yesterday neighbors/friends concerned with her dyspnea trying to climb the stairs and called 911. Chart review also notes pt was c/o chest pain and has noted LE swelling lately while at [facility] ED.     Was mildly hypoxemic at arrival to [facility] but after ABG revealed lactic acidemia with pH 7.07, she was intubated. CTA revealed PE with radiographic evidence of RHS.     REVIEW OF SYSTEMS:  UNABLE TO OBTAIN 2/2 CURRENT CONDITION    PAST MEDICAL & SURGICAL HISTORY:  No pertinent past medical history  No significant past surgical history    FAMILY HISTORY:  UNABLE TO OBTAIN    SOCIAL HISTORY: UNABLE TO OBTAIN  Smoking Status: [ ] Current, [ ] Former, [ ] Never  Pack Years:    MEDICATIONS:  Pulmonary:    Antimicrobials:    Anticoagulants:  heparin  Infusion.  Unit(s)/Hr IV Continuous <Continuous>  heparin  Injectable 8000 Unit(s) IV Push every 6 hours PRN  heparin  Injectable 4000 Unit(s) IV Push every 6 hours PRN    Onc:    GI/:    Endocrine:  vasopressin Infusion 0.02 Unit(s)/Min IV Continuous <Continuous>    Cardiac:  DOPamine Infusion 10 MICROgram(s)/kG/Min IV Continuous <Continuous>  norepinephrine Infusion 0.05 MICROgram(s)/kG/Min IV Continuous <Continuous>  norepinephrine Infusion 0.05 MICROgram(s)/kG/Min IV Continuous <Continuous>    Other Medications:  fentaNYL   Infusion. 0.5 MICROgram(s)/kG/Hr IV Continuous <Continuous>  propofol Infusion 5 MICROgram(s)/kG/Min IV Continuous <Continuous>  sodium chloride 0.9%. 1000 milliLiter(s) IV Continuous <Continuous>    Allergies    No Known Allergies    Intolerances    Vital Signs Last 24 Hrs  T(C): 36.2 (12 Mar 2020 00:48), Max: 37.3 (11 Mar 2020 16:37)  T(F): 97.2 (12 Mar 2020 00:48), Max: 99.2 (11 Mar 2020 16:37)  HR: 67 (12 Mar 2020 03:30) (61 - 130)  BP: 95/62 (12 Mar 2020 03:30) (65/49 - 168/73)  BP(mean): 72 (12 Mar 2020 03:30) (60 - 100)  RR: 16 (12 Mar 2020 03:30) (13 - 24)  SpO2: 100% (12 Mar 2020 03:30) (80% - 100%)    03-11 @ 07:01  -  03-12 @ 04:08  --------------------------------------------------------  IN: 310.8 mL / OUT: 15 mL / NET: 295.8 mL    Mode: AC/ CMV (Assist Control/ Continuous Mandatory Ventilation)  RR (machine): 12  TV (machine): 500  FiO2: 100  PEEP: 5  ITime: 1  MAP: 11  PIP: 21    PHYSICAL EXAM:  Constitutional: obese, ill appearing, sedated on vent  Head: NC/AT  EENT: PERRL, anicteric sclera w/ ETT in place  Neck: supple  Respiratory: diminished at bases  Cardiovascular: +S1/S2, RRR  Gastrointestinal: soft, NT/ND  Extremities: WWP; 1+ LE edema, clubbing or cyanosis  Vascular: 2+ radial pulses B/L  Neurological: sedated minimal response to pain    LABS:  ABG - ( 11 Mar 2020 22:30 )  pH, Arterial: 7.07  pH, Blood: x     /  pCO2: 53    /  pO2: 309   / HCO3: x     / Base Excess: x     /  SaO2: 99        CBC Full  -  ( 11 Mar 2020 16:40 )  WBC Count : 24.23 K/uL  RBC Count : 4.85 M/uL  Hemoglobin : 13.3 g/dL  Hematocrit : 41.2 %  Platelet Count - Automated : 239 K/uL  Mean Cell Volume : 84.9 fl  Mean Cell Hemoglobin : 27.4 pg  Mean Cell Hemoglobin Concentration : 32.3 gm/dL  Auto Neutrophil # : 20.04 K/uL  Auto Lymphocyte # : 2.91 K/uL  Auto Monocyte # : 0.97 K/uL  Auto Eosinophil # : 0.08 K/uL  Auto Basophil # : 0.06 K/uL  Auto Neutrophil % : 82.8 %  Auto Lymphocyte % : 12.0 %  Auto Monocyte % : 4.0 %  Auto Eosinophil % : 0.3 %  Auto Basophil % : 0.2 %    03-11    141  |  102  |  18  ----------------------------<  213<H>  3.6   |  24  |  1.33<H>    Ca    9.4      11 Mar 2020 16:40    TPro  7.6  /  Alb  3.8  /  TBili  0.6  /  DBili  x   /  AST  178<H>  /  ALT  133<H>  /  AlkPhos  66  03-11    PT/INR - ( 11 Mar 2020 23:45 )   PT: 14.9 sec;   INR: 1.34          PTT - ( 11 Mar 2020 23:45 )  PTT:23.0 sec      Urinalysis Basic - ( 11 Mar 2020 23:07 )    Color: Yellow / Appearance: Clear / SG: >=1.030 / pH: x  Gluc: x / Ketone: Trace mg/dL  / Bili: Moderate / Urobili: 1.0 E.U./dL   Blood: x / Protein: 100 mg/dL / Nitrite: POSITIVE   Leuk Esterase: Small / RBC: 5-10 /HPF / WBC > 10 /HPF   Sq Epi: x / Non Sq Epi: 0-5 /HPF / Bacteria: Present /HPF    RADIOLOGY & ADDITIONAL STUDIES:  CTA PE study 3/11 personally reviewed with bilateral large pulmonary emboli in the left and right main PAs

## 2020-03-12 NOTE — CHART NOTE - NSCHARTNOTEFT_GEN_A_CORE
Patient without any known family contacts. Unable to find any next of kin or relatives. No health care proxy.     Admitted with massive PE with severe shock. On markedly high doses of levophed and with severe metabolic derangements including severe lactic acidosis, renal failure, electrolyte abnormalities and leukocytosis. Patient's condition continued to deteriorate despite maximal aggressive care. Given very futile nature of her care given the low likelihood of survival, it was discussed and decided to NOT ESCALATE care as of this morning. It was also decided to make the patient DNR via 2 physician consent given lack of proxy or next of kin. This was discussed extensively with attending Dr. Miladis Torre.     Later in the day, lactate noted to be uptrending and pressor doses remains very high ( up to 3mcg/kg ). After extensive discussion with Dr Fajardo, given futile nature of her care at this time, decision made to withdraw pressors and allow natural death. it was the understanding of the medical team that treatment is unlikely to be successful and will only prolong suffering.

## 2020-03-12 NOTE — H&P ADULT - ASSESSMENT
78 y/o F w/no known PMHx presenting with SOB, s/p intubation, found to have A. Fib w/RVR, massive pulmonary embolism with obstructive shock, and UTI w/+ UA.  S/p TPA, ceftriaxone, with maximum pressor requirements.     Neuro  #Need for Sedation  -Sedated on propofol & fentanyl, RASS goal - 2    #Rule out opiate intoxication  -F/u utox     Cardiac  #Obstructive Shock  -bedside echo with +McConnel's sign  -CTA w/b/l distal main PA PE  -Hypotensive w/pressors  -S/p heparin gtt, TPA, PERT team activated  -c/w heparin gtt  -F/u official echo  -F/u LE Doppler    #Afib w/RVR  -S/p cardizem 20 mg IV 60 po   -Converted to sinus with intermittent runs of afib w/RVR  -unknown chronicity  -c/w heparin gtt  -start digoxin for rate control in setting of hypotension   -CHADsVASC >2, anticoagulation indicated.     #Troponemia  -R. heart strain on echo  -peaked, f/u EKG  -Cardiology consult    Pulmonary  #Acute hypercarbic respiratory failure  -ABG w/mixed metabolic and respiratory acidosis  -Likely in the setting of PE w/pulmonary shunt  -Monitor for improvement w/TPA  -F/u official echo     Gastrointestinal    #Acute Liver Injury  -Elevated transaminases w/o hyperbilirubinemia  -F/u utox, tylenol level    #GI ppx  -Pantoprazole 40 mg IV    Infectious  #Severe Sepsis   -Elevated lactate, +UA   -s/p Ceftriaxone in ED, s/p 3L NS (~30 cc/kg)  -Likely combination of septic shock and distributive shock given severe lactic acidosis   -f/u procalcitonin  -F/u UCx, BCx   -Flu neg, RVP neg, COVID-19 sent, presumptively negative given lack of pneumonia on CT    Renal  #Creatinine elevation  -s/p fluid resuscitation, unclear baseline  -poor UOP in setting of shock    Metabolic    #Hyperglycemia  -Glucose 213 on admission  -F/u A1c, TSH Free T4, lipid panel   -unknown baseline    Disposition: MICU, patient has a poor prognosis, severe acidosis, poor ventilation.   DVT PPX: heparin gtt  GI PPX: pantoprazole  Diet: NPO 78 y/o F w/no known PMHx presenting with SOB, s/p intubation, found to have A. Fib w/RVR, massive pulmonary embolism with obstructive shock, and UTI w/+ UA.  S/p TPA, ceftriaxone, with maximum pressor requirements.     Neuro  #Need for Sedation  -Sedated on propofol & fentanyl, RASS goal - 2    Cardiac  #Obstructive Shock  -bedside echo with +McConnel's sign  -CTA w/b/l distal main PA PE  -Hypotensive w/pressors  -S/p heparin gtt, TPA, PERT team activated  -c/w heparin gtt  -F/u official echo  -F/u LE Doppler    #Afib w/RVR  -S/p cardizem 20 mg IV 60 po in the ED at [facility]  -Converted to sinus with intermittent runs of afib after transfer, unknown chronicity   -c/w heparin gtt  -CHADsVASC >2, anticoagulation indicated.     #Tropinemia  - R. heart strain on echo, likely 2/2 to PE  - trend to peak  - official echo in AM     Pulmonary  #Acute hypercarbic respiratory failure  -ABG w/mixed metabolic and respiratory acidosis (s/p 6 amps of bicarb)  -Likely in the setting of PE w/ dead space w/ poor ventilation + lactic acidosis   -Monitor for improvement w/TPA  -F/u official echo     Gastrointestinal    #Acute Liver Injury  -Elevated transaminases w/o hyperbilirubinemia  -F/u utox, tylenol level  - likely in the setting of shock    #GI ppx  -Pantoprazole 40 mg IV    Infectious  #Severe Sepsis   -Elevated lactate, +UA   -s/p Ceftriaxone in ED, s/p 3L NS (~30 cc/kg)  -Likely combination of septic shock and distributive shock given severe lactic acidosis   -f/u procalcitonin  -F/u UCx, BCx   -Flu neg, RVP neg, COVID-19 sent, presumptively negative given lack of lung findings on CT outside of PE    Renal  #Creatinine elevation  -s/p fluid resuscitation, unclear baseline  -poor UOP in setting of shock    Metabolic    #Hyperglycemia  -Glucose 213 on admission  -F/u A1c, TSH Free T4, lipid panel   -unknown baseline    Disposition: MICU, patient has a poor prognosis, severe acidosis, poor ventilation.   DVT PPX: heparin gtt  GI PPX: pantoprazole  Diet: NPO

## 2020-03-12 NOTE — CONSULT NOTE ADULT - PROBLEM SELECTOR RECOMMENDATION 9
Massive PE w/ hemodynamic instability with bilateral left and right main PAs almost completely occluded on CTA and evidence of acute cor pulmonale. Clinically appears in cardiogenic shock, possibly w element of sepsis given initial tx for UTI, basic echo w/ severe hypokinesis of LV and RV Massive PE w/ hemodynamic instability with bilateral left and right main PAs almost completely occluded on CTA and evidence of acute cor pulmonale. Clinically appears in cardiogenic shock, possibly w element of sepsis given initial tx for UTI, basic echo w/ severe hypokinesis of LV and RV. Unknown HPI or PMH for risk factor determination. Unable to determine TAPSE 2/2 severe RV hypokinesis    Recommendations:  - start tPA initial bolus 50mg in 50cc sterile water over 15 mins, followed by an second tPA dose 50mg in 50cc sterile water over 2 hours  - c/w hep gtt  - discussed w/ CTSx for poss ECMO vs. thrombectomy however her age, COVID-ruleout, and current HD instability requiring excessive pressor doses altogether preclude candidacy for these measures  - LE dopplers  - formal echo  - c/w mechanical ventilation for now    D/w Dr. Liriano

## 2020-03-12 NOTE — PROGRESS NOTE ADULT - SUBJECTIVE AND OBJECTIVE BOX
OVERNIGHT EVENTS:    SUBJECTIVE / INTERVAL HPI: Patient seen and examined at bedside.     VITAL SIGNS:  Vital Signs Last 24 Hrs  T(C): 35.1 (12 Mar 2020 09:00), Max: 37.3 (11 Mar 2020 16:37)  T(F): 95.1 (12 Mar 2020 09:00), Max: 99.2 (11 Mar 2020 16:37)  HR: 103 (12 Mar 2020 09:36) (61 - 130)  BP: 96/63 (12 Mar 2020 09:36) (65/49 - 168/73)  BP(mean): 75 (12 Mar 2020 09:36) (58 - 100)  RR: 23 (12 Mar 2020 09:20) (13 - 30)  SpO2: 94% (12 Mar 2020 09:36) (57% - 100%)    PHYSICAL EXAM:  General: Ill appearing, intubated and sedated  HEENT: PERRL, MMM, ET tube secured in place  Neck: supple, unable to assess for JVD due to habitus  Cardiovascular: +S1/S2; rapid rate with regular rhythm  Respiratory: CTA B/L  Gastrointestinal: soft, NT/ND, normal bowel sounds x4 quadrants  Extremities: WWP; no edema, clubbing or cyanosis  Vascular: 2+ radial pulses  Neurological: AAOx0, nonresponsive to painful or noxious stimuli    MEDICATIONS:  MEDICATIONS  (STANDING):  cefTRIAXone   IVPB 1000 milliGRAM(s) IV Intermittent every 24 hours  chlorhexidine 2% Cloths 1 Application(s) Topical <User Schedule>  fentaNYL   Infusion. 0.5 MICROgram(s)/kG/Hr (4.85 mL/Hr) IV Continuous <Continuous>  heparin  Infusion 1800 Unit(s)/Hr (18 mL/Hr) IV Continuous <Continuous>  norepinephrine Infusion 0.05 MICROgram(s)/kG/Min (4.55 mL/Hr) IV Continuous <Continuous>  pantoprazole  Injectable 40 milliGRAM(s) IV Push every 24 hours  propofol Infusion 5 MICROgram(s)/kG/Min (2.91 mL/Hr) IV Continuous <Continuous>  vasopressin Infusion 0.04 Unit(s)/Min (2.4 mL/Hr) IV Continuous <Continuous>    MEDICATIONS  (PRN):      ALLERGIES: NKDA    LABS:                        12.3   42.11 )-----------( 87       ( 12 Mar 2020 06:18 )             43.0     03-12    150<H>  |  110<H>  |  21  ----------------------------<  83  4.2   |  13<L>  |  1.54<H>    Ca    7.1<L>      12 Mar 2020 09:14  Phos  14.7     03-12  Mg     2.8     03-12    TPro  4.5<L>  /  Alb  2.6<L>  /  TBili  1.0  /  DBili  x   /  AST  561<H>  /  ALT  512<H>  /  AlkPhos  89  03-12    PT/INR - ( 11 Mar 2020 23:45 )   PT: 14.9 sec;   INR: 1.34          PTT - ( 11 Mar 2020 23:45 )  PTT:23.0 sec  Urinalysis Basic - ( 11 Mar 2020 23:07 )    Color: Yellow / Appearance: Clear / SG: >=1.030 / pH: x  Gluc: x / Ketone: Trace mg/dL  / Bili: Moderate / Urobili: 1.0 E.U./dL   Blood: x / Protein: 100 mg/dL / Nitrite: POSITIVE   Leuk Esterase: Small / RBC: 5-10 /HPF / WBC > 10 /HPF   Sq Epi: x / Non Sq Epi: 0-5 /HPF / Bacteria: Present /HPF    RADIOLOGY & ADDITIONAL TESTS: MARCELLA leal PE protocol reviewed

## 2020-03-12 NOTE — H&P ADULT - HISTORY OF PRESENT ILLNESS
80 y/o obese F w/no known PMHx as she does not visit a physician. She spoke with the ED attending, endorsing progressive ABDALLA and shortness of breath with going up 1 flight of stairs. She previously was able to climb 4 flights of stairs before she became short of breath. She denied any recent travel or sick contacts. She hasn't left her apartment in a few weeks.    ED Course:  She was noted to be increasingly short of breath while in the ED without hypoxia. She was isolated for COVID-19 r/o and specimens were sent. She was treated with Ceftriaxone 1g, She developed a. fib w/RVR to a rate of 136 and received 20 IV cardizem and 60 of PO and converted to sinus tachycardia. She was intubated to ongoing respiratory distress. She had a CTA which revealed bilateral distal main pulmonary artery pulmonary emboli and she was started on heparin gtt. 80 y/o obese F w/no known PMHx as she does not visit a physician and endorsed none to the ED attending. She spoke with the ED attending, endorsing progressive ABDALLA and shortness of breath with going up 1 flight of stairs. She previously was able to climb 4 flights of stairs before she became short of breath. She denied any recent travel or sick contacts. She hasn't left her apartment in a few weeks, but her neighbors noticed that she had increased difficulty climbing stairs. They called EMS. She was taken to [facility] ED and provided her own history. There were no known family members or sick contacts. She denied recent travel.    ED Course:  She was noted to be increasingly short of breath while in the ED without hypoxia. She was isolated for COVID-19 r/o and specimens were sent. She was treated with Ceftriaxone 1g, She developed a. fib w/RVR to a rate of 136 and received 20 IV cardizem and 60 of PO and converted to sinus tachycardia. She was intubated to ongoing respiratory distress. She had a CTA which revealed bilateral distal main pulmonary artery pulmonary emboli and she was started on heparin gtt prior to transfer to [facility].

## 2020-03-12 NOTE — DIETITIAN INITIAL EVALUATION ADULT. - PERTINENT LABORATORY DATA
WBC 42.11 (H), Na 146 (H), K 5.7 (H), Mg 2.8 (H), Phos 14.7 (H), BUN 20/Cr 1.44, LFTs (H), lactate 10.5 (H)

## 2020-03-12 NOTE — CONSULT NOTE ADULT - SUBJECTIVE AND OBJECTIVE BOX
Surgeon: Dr. Matthias Reyes    Requesting Physician: Dr. Liriano    HISTORY OF PRESENT ILLNESS (Need 4):  This is an 79 year old female with no significant past medical history who per chart has not seen a doctor in many years, presented to ED c/o SOB. Reports she lives in a 4th floor walk-up, and notes progressively increasing SOB with walking up the stairs requiring frequent breaks. And yesterday patient reported she became SOB while walking up 1 flight of stairs but was unable to "regain her breath" which caused neighbors  to call 911.  Admits to intermittent irregular palpitations for "a long time.   Patient denies any fever, chills, cough/URI symptoms, or CP. She states she has not left the apartment for about 1 week, and denies any recent known sick contacts and no recent travel.  COVID-19 was ruled out.  CTA done 3/12/20 revealed Bilateral pulmonary emboli with secondary signs consistent with right heart strain. Structural Heart Disease team, Dr. Reyes, was consulted for possibly ECMO vs. thrombectomy.    PAST MEDICAL & SURGICAL HISTORY:  No pertinent past medical history  No significant past surgical history      MEDICATIONS  (STANDING):  cefTRIAXone   IVPB 1000 milliGRAM(s) IV Intermittent every 24 hours  chlorhexidine 2% Cloths 1 Application(s) Topical <User Schedule>  fentaNYL   Infusion. 0.5 MICROgram(s)/kG/Hr (4.85 mL/Hr) IV Continuous <Continuous>  heparin  Infusion 1800 Unit(s)/Hr (18 mL/Hr) IV Continuous <Continuous>  norepinephrine Infusion 0.05 MICROgram(s)/kG/Min (4.55 mL/Hr) IV Continuous <Continuous>  pantoprazole  Injectable 40 milliGRAM(s) IV Push every 24 hours  propofol Infusion 5 MICROgram(s)/kG/Min (2.91 mL/Hr) IV Continuous <Continuous>  vasopressin Infusion 0.04 Unit(s)/Min (2.4 mL/Hr) IV Continuous <Continuous>    MEDICATIONS  (PRN):      Allergies    No Known Allergies    Intolerances    SOCIAL HISTORY: Unable to solicit-pt intubated/sedated  Smoker:  YES / NO        PACK YEARS:                         WHEN QUIT?  ETOH use:  YES / NO               FREQUENCY / QUANTITY:  Ilicit Drug use:  YES / NO  Occupation:  Assisted device use (Cane / Walker):  Live with:    FAMILY HISTORY: Unable to solicit-pt intubated/sedated      Review of Systems (Need 10): Attained from initial interview  CONSTITUTIONAL: Denies fevers / chills, sweats, fatigue, weight loss, weight gain                                       NEURO:  Denies parathesias, seizures, syncope, confusion                                                                                  EYES:  Denies blurry vision, discharge, pain, loss of vision                                                                                    ENMT:  Denies difficulty hearing, vertigo, dysphagia, epistaxis, recent dental work                                       CV:  Denies chest pain, orthopnea                                           / Admit ABDALLA , palpitations                                                                          RESPIRATORY:  Denies Wheezing,, cough / sputum, hemoptysis       /Admit  SOB                                                        GI:  Denies nausea, vomiting, diarrhea, constipation, melena                                                                          : Denies hematuria, dysuria, urgency, incontinence                                                                                          MUSKULOSKELETAL:  Denies arthritis, joint swelling, muscle weakness                                                             SKIN/BREAST:  Denies rash, itching, hair loss, masses                                                                                              PSYCH:  Denies depression, anxiety, suicidal ideation                                                                                                HEME/LYMPH:  Denies bruises easily, enlarged lymph nodes, tender lymph nodes                                          ENDOCRINE:  Denies cold intolerance, heat intolerance, polydipsia                                                                      Vital Signs Last 24 Hrs  T(C): 35.1 (12 Mar 2020 09:00), Max: 37.3 (11 Mar 2020 16:37)  T(F): 95.1 (12 Mar 2020 09:00), Max: 99.2 (11 Mar 2020 16:37)  HR: 103 (12 Mar 2020 09:36) (61 - 130)  BP: 96/63 (12 Mar 2020 09:36) (65/49 - 168/73)  BP(mean): 75 (12 Mar 2020 09:36) (58 - 100)  RR: 23 (12 Mar 2020 09:20) (13 - 30)  SpO2: 94% (12 Mar 2020 09:36) (57% - 100%)    Physical Exam (Need 8)  Physical Exam  CONSTITUTIONAL: Patient seen bedside intubated, unresponsive                                                     NEURO:  unresponsive                 EYES:      PERRL           ENMT:  supple neck, no lymphadenopathy, no bruit B/L carotids appreciated  CV:     tachycardia, S1S2, no murmur  RESPIRATORY:  equal breath sounds, no rales, transmitted rhonci, no wheeze  GI:  soft, nontender, positive bowel sounds  : YOUNG + / -  positive  MUSKULOSKELETAL:  no spontaneous movement of extremities, difficult to appreciate distal pulses,  b/l 1+ femoral artery  SKIN / BREAST:    right arm ecchymosis possibly 2/2 to karin attempt    LABS:                        12.3   42.11 )-----------( 87       ( 12 Mar 2020 06:18 )             43.0     03-12    150<H>  |  110<H>  |  21  ----------------------------<  83  4.2   |  13<L>  |  1.54<H>    Ca    7.1<L>      12 Mar 2020 09:14  Phos  14.7     03-12  Mg     2.8     03-12    TPro  4.5<L>  /  Alb  2.6<L>  /  TBili  1.0  /  DBili  x   /  AST  561<H>  /  ALT  512<H>  /  AlkPhos  89  03-12    PT/INR - ( 11 Mar 2020 23:45 )   PT: 14.9 sec;   INR: 1.34          PTT - ( 11 Mar 2020 23:45 )  PTT:23.0 sec  Urinalysis Basic - ( 11 Mar 2020 23:07 )    Color: Yellow / Appearance: Clear / SG: >=1.030 / pH: x  Gluc: x / Ketone: Trace mg/dL  / Bili: Moderate / Urobili: 1.0 E.U./dL   Blood: x / Protein: 100 mg/dL / Nitrite: POSITIVE   Leuk Esterase: Small / RBC: 5-10 /HPF / WBC > 10 /HPF   Sq Epi: x / Non Sq Epi: 0-5 /HPF / Bacteria: Present /HPF      CARDIAC MARKERS ( 12 Mar 2020 05:53 )  x     / 1.16 ng/mL / x     / x     / x      CARDIAC MARKERS ( 12 Mar 2020 04:48 )  x     / 1.53 ng/mL / 175 U/L / x     / 14.9 ng/mL  CARDIAC MARKERS ( 11 Mar 2020 16:40 )  0.322 ng/mL / x     / 76 U/L / x     / 3.2 ng/mL    RADIOLOGY & ADDITIONAL STUDIES:  CAROTID U/S: n/a    CXR:< from: Xray Chest 1 View AP/PA (03.11.20 @ 16:59) >    IMPRESSION:  No acute pulmonary pathology.    < end of copied text >      CT Scan:    EKG:< from: CT Angio Chest PE Protocol w/ IV Cont (03.11.20 @ 23:37) >  IMPRESSION:     Bilateral pulmonary emboli with secondary signs consistent with right heart strain.    < end of copied text >      TTE: < from: TTE Echo Complete w/o contrast w/ Doppler (03.12.20 @ 05:35) >  CONCLUSIONS:     1. Limited study obtained for evaluation of right ventricular function performed by cardiology fellow on call.   2. The right ventricle is dilated. The tricuspid annular plane systolic excursion (TAPSE) is 6.00 mm (normal >=17 mm). Right ventricular systolic function is reduced with apical sparing consistent with acute pulmonary embolism (Sage's sign).   3. Right pleural effusion.   4. No pericardial effusion.    < end of copied text >      Cardiac Cath:

## 2020-03-12 NOTE — DIETITIAN INITIAL EVALUATION ADULT. - ENTERAL
As medically feasible and hemodynamically stable w/pressors downtrending, recommend initiating trickle enteral feeds.

## 2020-03-12 NOTE — H&P ADULT - NSHPPHYSICALEXAM_GEN_ALL_CORE
.  VITAL SIGNS:  T(F): 95.1 (03-12-20 @ 09:00), Max: 99.2 (03-11-20 @ 16:37)  HR: 103 (03-12-20 @ 09:36) (61 - 130)  BP: 96/63 (03-12-20 @ 09:36) (65/49 - 168/73)  BP(mean): 75 (03-12-20 @ 09:36) (58 - 100)  RR: 23 (03-12-20 @ 09:20) (13 - 30)  SpO2: 94% (03-12-20 @ 09:36) (57% - 100%)    PHYSICAL EXAM:    Constitutional: obese female, intubated/sedated   HEENT: NC/AT large seborrheic keratosis on forehead, pupils equal,, anicteric sclera, no nasal discharge  Neck: supple  Respiratory: CTA B/L; no W/R/R, no retractions  Cardiac: +S1/S2; RRR; faint heart sounds  Gastrointestinal: soft, NT/ND; no rebound or guarding; +BSx4  Genitourinary: normal external genitalia  Extremities: WWP, no clubbing or cyanosis; no peripheral edema, dusky feet b/l. corn on L foot  Vascular: 2+ radial, DP pulses B/L  Dermatologic: skin cool in distal extremities, dry and intact; no rashes, wounds, or scars  Neurologic: AAOx0; not responsive to noxious stimuli

## 2020-03-13 NOTE — DISCHARGE NOTE FOR THE EXPIRED PATIENT - HOSPITAL COURSE
78 y/o obese F w/no known PMHx as she does not visit a physician and endorsed none to the ED attending. She spoke with the ED attending, endorsing progressive ABDALLA and shortness of breath with going up 1 flight of stairs. She previously was able to climb 4 flights of stairs before she became short of breath. She denied any recent travel or sick contacts. She hadn't left her apartment in a few weeks, but her neighbors noticed that she had increased difficulty climbing stairs. They called EMS. She was taken to [facility] ED and provided her own history. There were no known family members or sick contacts. She denied recent travel. She was placed on COVID-19 rule out due to shortness of breath and given antibiotics for pneumonia. CT w/ PE protocol revealed bilateral distal main PA pulmonary embolism. She was started on heparin gtt at [facility]. UA positive. She was transferred to [facility] on levophed. At [facility], bedside echocardiogram performed with positive Sage's sign. Progressive hypotension. emergent arterial line placed. Patient required significant pressor support due to clot burden. PERT team activated, patient received TPA and started on heparin gtts. Very significant PE and high pressor support. ABG revealed very significant lactic acidosis. Bicarbonate pushes were given, but her acidosis did not improve significantly. Eventually, pressors were capped due to futility and care was withdrawn after two physician consent was obtained. She was pronounced dead at 9:40 pm.

## 2020-03-17 LAB
CULTURE RESULTS: SIGNIFICANT CHANGE UP
CULTURE RESULTS: SIGNIFICANT CHANGE UP
SPECIMEN SOURCE: SIGNIFICANT CHANGE UP
SPECIMEN SOURCE: SIGNIFICANT CHANGE UP

## 2020-03-19 DIAGNOSIS — I48.91 UNSPECIFIED ATRIAL FIBRILLATION: ICD-10-CM

## 2020-03-19 DIAGNOSIS — R57.0 CARDIOGENIC SHOCK: ICD-10-CM

## 2020-03-19 DIAGNOSIS — J98.11 ATELECTASIS: ICD-10-CM

## 2020-03-19 DIAGNOSIS — R73.9 HYPERGLYCEMIA, UNSPECIFIED: ICD-10-CM

## 2020-03-19 DIAGNOSIS — N39.0 URINARY TRACT INFECTION, SITE NOT SPECIFIED: ICD-10-CM

## 2020-03-19 DIAGNOSIS — I26.09 OTHER PULMONARY EMBOLISM WITH ACUTE COR PULMONALE: ICD-10-CM

## 2020-03-19 DIAGNOSIS — J96.01 ACUTE RESPIRATORY FAILURE WITH HYPOXIA: ICD-10-CM

## 2020-03-19 DIAGNOSIS — E87.4 MIXED DISORDER OF ACID-BASE BALANCE: ICD-10-CM

## 2020-03-19 DIAGNOSIS — J96.02 ACUTE RESPIRATORY FAILURE WITH HYPERCAPNIA: ICD-10-CM

## 2020-03-19 DIAGNOSIS — R65.21 SEVERE SEPSIS WITH SEPTIC SHOCK: ICD-10-CM

## 2020-03-19 DIAGNOSIS — A41.9 SEPSIS, UNSPECIFIED ORGANISM: ICD-10-CM

## 2020-03-19 DIAGNOSIS — K72.00 ACUTE AND SUBACUTE HEPATIC FAILURE WITHOUT COMA: ICD-10-CM

## 2020-04-14 NOTE — DIETITIAN INITIAL EVALUATION ADULT. - ADD RECOMMEND
Proton Pump Inhibitor (PPI) Refill Protocol Passed   omeprazole (PRILOSEC) 20 MG capsule   4/14/2020 12:47 AM       Seen by prescribing provider or same department within the last 12 months or has a future appt in 3 months - IF FAILED PLEASE LOOK AT CHART REVIEW FOR LAST VISIT AND PROCEED ACCORDINGLY       Not on Clopidogrel (Plavix) or if on, refill is for Pantoprazole (Protonix)       Medication (including dose and sig) on current meds list     
1. See EN recs above. Recommend Jevity 1.2 Jose @ 20mL/hr x 24hrs via NGT. 2. Monitor lytes and replete prn. POC BG q6hrs 3. Pain and bowel regimens per team 4. Keep nutrition aligned w/goals of care at all times

## 2022-08-02 NOTE — PATIENT PROFILE ADULT - NSPROMUTANXFEARADDRESSFT_GEN_A_NUR
[Right] : right knee [5___] : hamstring 5[unfilled]/5 [Equivocal] : equivocal Leah [] : negative Lachmann [[id number]] : flexion 120 degrees [de-identified] : extension 0 degrees TIERA intubated and sedated

## 2023-01-01 NOTE — ED ADULT NURSE NOTE - OBJECTIVE STATEMENT
PT presented with sob, unable to complete full sentences, c/o chest pain after walking up the stairs. Pt denies pmhx or medications. Afebrile.
Statement Selected

## 2023-05-14 NOTE — ED PROVIDER NOTE - DATE/TIME 3
PT presents to the ED c/o of palpitations starting tonight. Pt endorses having x6 beers today.
11-Mar-2020 23:39